# Patient Record
Sex: MALE | Race: WHITE
[De-identification: names, ages, dates, MRNs, and addresses within clinical notes are randomized per-mention and may not be internally consistent; named-entity substitution may affect disease eponyms.]

---

## 2018-12-06 VITALS — SYSTOLIC BLOOD PRESSURE: 116 MMHG | DIASTOLIC BLOOD PRESSURE: 62 MMHG

## 2019-03-13 ENCOUNTER — HOSPITAL ENCOUNTER (OUTPATIENT)
Dept: HOSPITAL 61 - US | Age: 74
Discharge: HOME | End: 2019-03-13
Attending: INTERNAL MEDICINE
Payer: COMMERCIAL

## 2019-03-13 DIAGNOSIS — I83.92: ICD-10-CM

## 2019-03-13 DIAGNOSIS — I73.9: Primary | ICD-10-CM

## 2019-03-13 PROCEDURE — 93970 EXTREMITY STUDY: CPT

## 2019-03-13 PROCEDURE — 93925 LOWER EXTREMITY STUDY: CPT

## 2019-03-14 NOTE — RAD
MR#: [id number]

Account#: [id number]

Accession#: [id number]

Date of Study: 03/13/2019

Ordering Physician: LAVON MARCOS, 

Referring Physician: LAVON MARCOS, 

Tech: Jelani Lala MBA, RDMS, RVT, RDCS, RTR





--------------- APPROVED REPORT --------------





Patient Location : OUT-PATIENT



Indications

PAD



Findings

Grayscale images of the bilateral lower extremity superficial veins reveal that the previous greater 
saphenous veins were stripped bilaterally.



The bilateral lesser saphenous veins do not show any obvious evidence of thrombus and also do not abby
w any evidence of reflux.



Incidental note is made of large varicose veins in the superficial aspect of the right calf.



Critical Notification

Critical Value: No



<Conclusion>

No significant reflux in the bilateral lesser saphenous veins. Bilateral greater saphenous veins have
 been previously ablated/stripped



Signed by : Lavon Marcos, 

Electronically Approved : 03/14/2019 16:34:56

## 2019-03-14 NOTE — RAD
MR#: [id number]

Account#: [id number]

Accession#: [id number]

Date of Study: 03/13/2019

Ordering Physician: LAVON MARCOS, 

Referring Physician: LAVON MARCOS, 

Tech: Jelani Lala MBA, RDMS, RVT, RDCS, RTR





--------------- APPROVED REPORT --------------





Patient Location: OUT-PATIENT



Indications

PAD



VELOCITY AND DOPPLER WAVEFORM ANALYSIS

RIGHT cm/secWaveformSeverity LEFT cm/secWaveform Severity 

dCFA 119.0BiphasicdCFA 88.0Triphasic

Prof Fem Art. 68.0BiphasicProf Fem Art. 52.0Biphasic

Fem Art Prox. 200.0TriphasicFem Art Prox. 198.0Triphasic

Fem Art Mid. 311.0TriphasicFem Art Mid. 127.0Triphasic

Fem Art Dist. 92.0TriphasicFem Art Dist. 85.0Triphasic

Pop Art(Fossa) 70.0TriphasicPop Art(AK) 48.0Triphasic

PTA Prox. 60.0MonophasicPTA Prox. 47.0Monophasic

PTA Dist. 89.0MonophasicPTA Dist. 47.0Monophasic

KERRY Prox. 48.0TriphasicATA Prox. 75.0Triphasic

DPA 41MonophasicDPA 60Monophasic



Image





Findings

Grayscale images of the bilateral lower extremity arterial vessels reveals diffuse athero-'s chronic 
plaque. Spectral waveforms and color Doppler in the common femoral and profunda femoris on the right 
are within normal limits. Velocities are elevated in the mid SFA suggestive of greater than 50% steno
sis. There is 2 vessel runoff below the knee with nonvisualization of the peroneal artery.



On the left there are triphasic waveforms from the common femoral artery to the popliteal segment. Be
low the knee the posterior tibial and anterior tibial vessels demonstrate monophasic and triphasic wa
veforms respectively without any focal stenosis. The peroneal artery again is not visualized.



Critical Notification

Critical Value: No



<Conclusion>

1. Likely high-grade stenosis involving the right mid SFA.



Signed by : Lavon Marcos, 

Electronically Approved : 03/14/2019 16:39:06

## 2019-03-26 ENCOUNTER — HOSPITAL ENCOUNTER (OUTPATIENT)
Dept: HOSPITAL 61 - CCL | Age: 74
Discharge: HOME | End: 2019-03-26
Attending: INTERNAL MEDICINE
Payer: COMMERCIAL

## 2019-03-26 VITALS
DIASTOLIC BLOOD PRESSURE: 61 MMHG | DIASTOLIC BLOOD PRESSURE: 61 MMHG | DIASTOLIC BLOOD PRESSURE: 61 MMHG | DIASTOLIC BLOOD PRESSURE: 61 MMHG | SYSTOLIC BLOOD PRESSURE: 119 MMHG | SYSTOLIC BLOOD PRESSURE: 119 MMHG | SYSTOLIC BLOOD PRESSURE: 119 MMHG | DIASTOLIC BLOOD PRESSURE: 61 MMHG | SYSTOLIC BLOOD PRESSURE: 119 MMHG | SYSTOLIC BLOOD PRESSURE: 119 MMHG | SYSTOLIC BLOOD PRESSURE: 119 MMHG | DIASTOLIC BLOOD PRESSURE: 61 MMHG

## 2019-03-26 VITALS — SYSTOLIC BLOOD PRESSURE: 103 MMHG | DIASTOLIC BLOOD PRESSURE: 64 MMHG

## 2019-03-26 VITALS — SYSTOLIC BLOOD PRESSURE: 125 MMHG | DIASTOLIC BLOOD PRESSURE: 66 MMHG

## 2019-03-26 VITALS — DIASTOLIC BLOOD PRESSURE: 76 MMHG | SYSTOLIC BLOOD PRESSURE: 122 MMHG

## 2019-03-26 VITALS — DIASTOLIC BLOOD PRESSURE: 79 MMHG | SYSTOLIC BLOOD PRESSURE: 110 MMHG

## 2019-03-26 VITALS — WEIGHT: 205 LBS | HEIGHT: 69 IN | BODY MASS INDEX: 30.36 KG/M2

## 2019-03-26 VITALS — SYSTOLIC BLOOD PRESSURE: 121 MMHG | DIASTOLIC BLOOD PRESSURE: 73 MMHG

## 2019-03-26 VITALS — SYSTOLIC BLOOD PRESSURE: 97 MMHG | DIASTOLIC BLOOD PRESSURE: 54 MMHG

## 2019-03-26 VITALS — SYSTOLIC BLOOD PRESSURE: 111 MMHG | DIASTOLIC BLOOD PRESSURE: 62 MMHG

## 2019-03-26 VITALS — DIASTOLIC BLOOD PRESSURE: 79 MMHG | SYSTOLIC BLOOD PRESSURE: 139 MMHG

## 2019-03-26 DIAGNOSIS — Z95.5: ICD-10-CM

## 2019-03-26 DIAGNOSIS — I25.2: ICD-10-CM

## 2019-03-26 DIAGNOSIS — J44.9: ICD-10-CM

## 2019-03-26 DIAGNOSIS — Z82.49: ICD-10-CM

## 2019-03-26 DIAGNOSIS — E78.5: ICD-10-CM

## 2019-03-26 DIAGNOSIS — I25.110: ICD-10-CM

## 2019-03-26 DIAGNOSIS — I70.213: Primary | ICD-10-CM

## 2019-03-26 DIAGNOSIS — I50.9: ICD-10-CM

## 2019-03-26 DIAGNOSIS — Z01.818: ICD-10-CM

## 2019-03-26 DIAGNOSIS — Z79.899: ICD-10-CM

## 2019-03-26 DIAGNOSIS — F17.200: ICD-10-CM

## 2019-03-26 DIAGNOSIS — I11.0: ICD-10-CM

## 2019-03-26 LAB
ANION GAP SERPL CALC-SCNC: 8 MMOL/L (ref 6–14)
BUN SERPL-MCNC: 30 MG/DL (ref 8–26)
CALCIUM SERPL-MCNC: 8.6 MG/DL (ref 8.5–10.1)
CHLORIDE SERPL-SCNC: 105 MMOL/L (ref 98–107)
CO2 SERPL-SCNC: 29 MMOL/L (ref 21–32)
CREAT SERPL-MCNC: 1.8 MG/DL (ref 0.7–1.3)
ERYTHROCYTE [DISTWIDTH] IN BLOOD BY AUTOMATED COUNT: 15.3 % (ref 11.5–14.5)
GFR SERPLBLD BASED ON 1.73 SQ M-ARVRAT: 37.3 ML/MIN
GLUCOSE SERPL-MCNC: 97 MG/DL (ref 70–99)
HCT VFR BLD CALC: 43.4 % (ref 39–53)
HGB BLD-MCNC: 13.8 G/DL (ref 13–17.5)
MCH RBC QN AUTO: 28 PG (ref 25–35)
MCHC RBC AUTO-ENTMCNC: 32 G/DL (ref 31–37)
MCV RBC AUTO: 89 FL (ref 79–100)
PLATELET # BLD AUTO: 236 X10^3/UL (ref 140–400)
POTASSIUM SERPL-SCNC: 4.8 MMOL/L (ref 3.5–5.1)
PROTHROMBIN TIME: 12.4 SEC (ref 11.7–14)
RBC # BLD AUTO: 4.85 X10^6/UL (ref 4.3–5.7)
SODIUM SERPL-SCNC: 142 MMOL/L (ref 136–145)
WBC # BLD AUTO: 8.1 X10^3/UL (ref 4–11)

## 2019-03-26 PROCEDURE — 75710 ARTERY X-RAYS ARM/LEG: CPT

## 2019-03-26 PROCEDURE — C1769 GUIDE WIRE: HCPCS

## 2019-03-26 PROCEDURE — 36245 INS CATH ABD/L-EXT ART 1ST: CPT

## 2019-03-26 PROCEDURE — 75625 CONTRAST EXAM ABDOMINL AORTA: CPT

## 2019-03-26 PROCEDURE — 80048 BASIC METABOLIC PNL TOTAL CA: CPT

## 2019-03-26 PROCEDURE — 93880 EXTRACRANIAL BILAT STUDY: CPT

## 2019-03-26 PROCEDURE — 85027 COMPLETE CBC AUTOMATED: CPT

## 2019-03-26 PROCEDURE — 99152 MOD SED SAME PHYS/QHP 5/>YRS: CPT

## 2019-03-26 PROCEDURE — 99153 MOD SED SAME PHYS/QHP EA: CPT

## 2019-03-26 PROCEDURE — 36246 INS CATH ABD/L-EXT ART 2ND: CPT

## 2019-03-26 PROCEDURE — 85610 PROTHROMBIN TIME: CPT

## 2019-03-26 PROCEDURE — C1892 INTRO/SHEATH,FIXED,PEEL-AWAY: HCPCS

## 2019-03-26 PROCEDURE — 36415 COLL VENOUS BLD VENIPUNCTURE: CPT

## 2019-03-26 NOTE — CARD
MR#: [id number]

Account#: [id number]

Accession#: [id number]

Date of Study: 03/26/2019

Ordering Physician: LAVON MARCOS, 

Referring Physician: LAVON MARCOS, 

Tech: Maritaanderson Gunderson RTR





--------------- APPROVED REPORT --------------





Patient StatusCLI

Cardiac Technologist:      Marita Gunderson RTR

Procedure(s) performed: Moderate Sedation time: 43 MIN

FLUORO TIME: 2.6 MIN

Abdominal aortogram with unilateral ileofemoral run-off



HISTORY

The patient is a 74 year-old male with a history of : coronary artery disease, tobacco history() , hy
pertension, dyslipidemia.



INDICATION FOR PROCEDURE

The indication(s) include : Bilateral claudication.



PROCEDURE NARRATIVE

After appropriate informed consent, the patient was brought to the cath lab and placed in the supine 
position. Preprocedural timeout was completed and confirmed the right patient and procedure. The bila
teral groins were prepped and draped in usual sterile fashion. Moderate sedation acheived with Fentan
yl and Versed. The patient received 2000 units of Heparin for anticoagulation. 



Access: Under lidocaine local anesthesia, a 5Fr introducer sheath was placed in the LCFA via the tavon
fied seldinger technique with a J-tipped guidewire and an 18g needle. Diagnostic angiography was then
 performed using a 5Fr Omniflush catheter with digital subtraction angiography. Next, the contralater
al (RCFA) was accessed with the aid of the Omniflush catheter and a J-tipped guidewire. The omniflush
 was then exchanged for a long angled glide catheter which was then placed in the RCFA. Repeat right 
lower extremity with DSA was performed. Subsequently, the glidecatheter was used to do a pullback man
euver on the MIGUEL stenosis. No significant stenosis was identified at the levels of the common iliac 
arteries and the left external iliac artery. 



FINDINGS: 

AO: 120/80



AORTA: Moderate adventitial calcification without significant disease. 

RENAL arteries: Not well visualized. Grossly appear patent. 

RCIA: No significant disease. 

REIA: No significant disease. 

RIIA: Mild to moderate diffuse irregularities. 

RCFA: No significant disease. 

RSFA: Significant diffuse negative remodeling with a mid 60% stenosis. 

RPOP: No significant disease. 

RAT: Small caliber vessel with mid subtotal occlusion. 

RTP trunk: No significant disease. 

RPER: Small vessel with diffuse distal disease. 

RPT: Patent and provides robust flow to the foot. 



LCIA: Mild diffuse irregularities of upto 20%. 

MIGUEL: No significant disease.  

LIIA: Mild to moderate diffuse irregularities of up to 40%. 

LCFA: No significant disease. 



At case completion, the left sided sheath was removed via manual compression.



Conclusion

1. No significant aorto-iliac disease. 

2. Moderate non-critical RSFA disease. 





Recommendations

1. Due to lack of any significant CLI, will plan for continued exercise therapy with close outpatient
 ultrasound monitoring and consider PVI in the future as needed. 

2. Smoking cessation



Signed by : Lavon Marcos, 

Electronically Approved : 03/26/2019 13:36:10

## 2019-03-26 NOTE — PDOC
MODERATE SEDATION ASSESSMENT


RISKS/ALTERNATIVES


Risks/Alternatives


Risks and alternatives of this type of sedation and procedure discussed with:


RISK/ALTERNATIVES:  Patient





H & P ON CHART


H & P


H & P on chart and reviewed for co-morbid conditions and appropriate labs.


H&P ON CHART:  Yes





PREGNANCY STATUS


PREG STATUS ASSESSED:  N/A





MEDS/ALLERGIES REVIEWED


Meds/Allergies Reviewed


Medications and Allergies including time and route of recently administered 

narcotics and sedatives.


MEDS/ALLERGIES REVIEWED:  Yes





ASA RATING


ASA RATING:  II





AIRWAY ASSESSMENT


Airway Assessment


Airway patency, oral function limitations, presence  of caps, crowns, dentures, 

partials, and ability to extend neck assessed.


AIRWAY ASSESSMENT:  Yes





MALLAMPATI SCORE


MALLAMPATI SCORE:  II





PRE-SEDATION ASSESSMENT


PRE-SEDATION ASSESSMENT:  Yes











LAVON MARCOS MD Mar 26, 2019 10:58

## 2019-03-26 NOTE — RAD
MR#: [id number]

Account#: [id number]

Accession#: [id number]

Date of Study: 03/26/2019

Ordering Physician: LAVON MARCOS,

Referring Physician: LAVON MARCOS,

Tech: Jelani Lala MBA, RDMS, RVT, RDCS, RTR





--------------- APPROVED REPORT --------------





Patient Location: OUT-PATIENT

Laterality:Bilateral



Indications

PAD



Doppler Spectral Velocity Analysis

Right   Left    

pCCA     76/14 cm/spCCA     109/19 cm/s

mCCA     65/13 cm/smCCA     71/16 cm/s

dCCA     46/11 cm/sdCCA     73/18 cm/s

Bulb     47/12 cm/sBulb     58/15 cm/s

ECA      89/ cm/sECA      63/ cm/s

pICA     65/23 cm/spICA     63/21 cm/s

Otis     72/26 cm/smICA     90/32 cm/s

dICA     74/25 cm/sdICA     85/26 cm/s

Vert.    69/ cm/sVert.    

Subcl.   85/ cm/sSubcl.   106/ cm/s

ICA/CCA  0.97ICA/CCA  0.83



Findings

Grayscale images of the bilateral common carotid, internal and external carotid vessels reveals mild 
intimal hyperplasia and minimal atherosclerotic plaque.



Spectral waveforms and color Doppler evaluation does not reveal any focal high-grade stenosis involvi
ng the internal carotid arteries. No high-grade stenosis is noted in the external carotid arteries. O
verall 0 to less than 50% stenosis by velocity criteria in the internal carotid system.



The right vertebral velocity is within normal limits and appears to be antegrade. The left vertebral 
was not visualized/difficult images reveal no obvious flow noted.



Normal ICA to CCA ratios noted.



Normal subclavian velocities bilaterally.



Critical Notification

Critical Value: No



<Conclusion>

1. No significant carotid occlusive disease bilaterally.

2. Nonvisualized left vertebral artery, possibly related to congenitally small vessel versus occlusio
n.



Signed by : Lavon Marcos, 

Electronically Approved : 03/26/2019 14:52:51

## 2020-05-08 ENCOUNTER — HOSPITAL ENCOUNTER (OUTPATIENT)
Dept: HOSPITAL 61 - CT | Age: 75
Discharge: HOME | End: 2020-05-08
Attending: NURSE PRACTITIONER
Payer: MEDICARE

## 2020-05-08 DIAGNOSIS — N28.89: ICD-10-CM

## 2020-05-08 DIAGNOSIS — K40.90: Primary | ICD-10-CM

## 2020-05-08 DIAGNOSIS — K57.30: ICD-10-CM

## 2020-05-08 DIAGNOSIS — I25.10: ICD-10-CM

## 2020-05-08 DIAGNOSIS — I70.0: ICD-10-CM

## 2020-05-08 DIAGNOSIS — J98.4: ICD-10-CM

## 2020-05-08 PROCEDURE — 74176 CT ABD & PELVIS W/O CONTRAST: CPT

## 2020-05-08 NOTE — RAD
EXAM: CT Abdomen and Pelvis without IV contrast

 

CLINICAL HISTORY: LLQ PAIN. 

 

COMPARISON: none

 

TECHNIQUE: Helical CT of the abdomen and pelvis was performed without the 

administration of IV contrast. Axial, coronal and sagittal reformatted 

images were generated.

 

---PQRS compliance statement - One or more of the following individualized

dose reduction techniques were utilized for this study:

1.  Automated exposure control

2.  Adjustment of the mA and/or kV according to patient size

3.  Use of iterative reconstruction technique---

 

FINDINGS:

Lack of intravenous contrast limits evaluation of solid organs, 

vasculature, and lymph nodes. 

 

Lower chest: Linear opacities in the lingula, left lower lobe and medial 

right lower lobe likely scarring/atelectasis. Coronary artery 

calcifications are seen.

 

Abdomen and pelvis:

Liver and biliary system: Subcentimeter hypodense left hepatic lobe 

lesions too small to accurately characterize.  Gallbladder is normal. No 

biliary ductal dilatation.

 

Spleen: Unremarkable

 

Pancreas: Unremarkable

 

Adrenal glands: Unremarkable

 

Kidneys: No renal tract calculus. Subcentimeter hypodense right upper pole

renal lesion is too small to accurately characterize.

 

Lymph nodes/retroperitoneum: No abdominal or pelvic lymphadenopathy.

 

Vessels: Dense aortic calcifications are seen.

 

Bowel/Peritoneal cavity: Moderate colonic stool content is seen in the 

right colon. The left colon and sigmoid colon are essentially 

decompressed. A few colonic diverticula are seen without evidence for 

acute diverticulitis. No small or large bowel dilatation. No bowel 

obstruction.  Appendix is normal.  

No abdominal or pelvic ascites.

 

Abdominal wall: Small fat-containing left inguinal hernia.

 

Bladder: Bladder is unremarkable. 

 

Bones: Degenerative changes of the spine are seen. No aggressive osseous 

lesion. Changes of left total hip arthroplasty are seen. Prior vertebral 

augmentation changes of L2 are seen. Severe right hip joint 

osteoarthritis.

 

IMPRESSION:

1.  A few colonic diverticula are seen without evidence for acute 

diverticulitis. The descending colon and rectosigmoid are essentially 

decompressed.

2.  No bowel obstruction.

3.  Fat-containing left inguinal hernia.

4.  Appendix is normal.

5.  No renal tract calculus.

 

Electronically signed by: Cristian Albarran MD (5/8/2020 4:09 PM) SVETLANA

## 2020-07-20 ENCOUNTER — HOSPITAL ENCOUNTER (OUTPATIENT)
Dept: HOSPITAL 61 - NM | Age: 75
Discharge: HOME | End: 2020-07-20
Attending: INTERNAL MEDICINE
Payer: MEDICARE

## 2020-07-20 DIAGNOSIS — I08.0: Primary | ICD-10-CM

## 2020-07-20 DIAGNOSIS — I70.203: ICD-10-CM

## 2020-07-20 DIAGNOSIS — I25.2: ICD-10-CM

## 2020-07-20 DIAGNOSIS — R00.1: ICD-10-CM

## 2020-07-20 DIAGNOSIS — I25.5: ICD-10-CM

## 2020-07-20 DIAGNOSIS — I10: ICD-10-CM

## 2020-07-20 PROCEDURE — 93017 CV STRESS TEST TRACING ONLY: CPT

## 2020-07-20 PROCEDURE — 93306 TTE W/DOPPLER COMPLETE: CPT

## 2020-07-20 PROCEDURE — 78452 HT MUSCLE IMAGE SPECT MULT: CPT

## 2020-07-20 PROCEDURE — 93925 LOWER EXTREMITY STUDY: CPT

## 2020-07-20 PROCEDURE — A9500 TC99M SESTAMIBI: HCPCS

## 2020-07-20 NOTE — RAD
MR#: [id number]

Account#: [id number]

Accession#: [id number]

Date of Study: 07/20/2020

Ordering Physician: LAVON MARCOS, 

Referring Physician: BRANDON JOHNSON Tech: RT Earl (R) (N)





--------------- APPROVED REPORT --------------





Test Type:          Exercise

Stress Nurse/Tech: Kate Atkinson R.N.

Test Indications: ischemic cardiomyopathy

Cardiac History: CAD, cabg, MI x 3, htn,

Medications:     isosorbiide, lisinopril, metoprolol

Medical History: see ehr

Resting ECG:     SR

Resting Heart Rate: 51 bpm

Resting Blood Pressure: 133/59mmHg

Pretest Chest Pain: No chest pain



Nurse/Tech Notes

lungs cta, heart tones irregular

Consent: The procedure was explained to the patient in lay terms. Informed consent was witnessed. Crow
eout was entered into [facility]. History and Stress Test performed by RT Jeromy (R) (N)



Stress Symptoms

No chest pain or symptoms. Dyspnea



POST EXERCISE

Reason for Termination: Reached target heart rate

Target HR: Yes

Max HR: 130 bpm

88% of Maximum Predicted HR: 147 bpm

Exercise duration: 5:38 min:sec, 2 Stage

Exercise capacity: 4.6METs

Max Blood Pressure: 164/66mmHg

Blood Pressure response to exercise: Normal blood pressure response during stress.

Heart Rate response to exercise: normal

Chest Pain: No. 

Arrhythmia: Yes. PAC and PVC noted



INTERPRETATION

Stress EKG Conclusion: The resting EKG showed a sinus bradycardia.

The stress EKG showed ST depression in the inferior and lateral leads suggestive of ischemia.





Imaging Protocol

IMAGE PROTOCOL: Rest Tc-99m/stress Tc-99m 1 day



Rest:            Stress:         Viability:   

Radiopharm.Tc99m LirmpqqpfRo00s Sestamibi

Hgyd99iDe            32.6mCi            

Img Date  07/20/2020 07/20/2020      

Inj-Img Rtma96bnd.           60min.           



Rest Admin Site:IV - Right AntecubitalAdministrator:CAT James, ARRT (R)(N)

Stress Admin Site: IV - Right AntecubitalAdministrator: RT Scott PinzonR)(N)



STRESS DATA

End Diast. Vol.81.0mlLVEDV index BSA35.0ml

End Syst. Vol.39.0mlLVESV index BSA17.0ml

Myocardial Clns467.0gEject. Pizhbogb69.0%



Stress Scores

Regional WT2.00Summed WT9.00

Regional WM1.00Summed WM12.00



LV Perfusion

The stress scans showed an inferior lateral defect.

The rest scans showed an inferior lateral defect.

Nuclear imaging is consistent with an inferior lateral infarct with some probable mild nida-infarct r
eversible ischemia



Wall Motion

Left ventricular systolic function shows an ejection fraction of 54% and mild inferior hypokinesis.



LV Perf. Quant

17 Seg. SSS11.00

17 Seg. SRS20.00

17 Seg. SDS0.00

Stress Defect Extent (% LAD)0.00Rest Defect Extent (% LAD)33.10Rev. Defect Extent (% LAD)0.00

Stress Defect Extent (% LCX) 50.00Rest Defect Extent (% LCX)50.00Rev. Defect Extent (% LCX)0.00

Stress Defect Extent (% RCA)18.90Rest Defect Extent (% RCA)37.80Rev. Defect Extent (% RCA)0.00

Stress Defect Extent (% ROSANA)19.30Rest Defect Extent (% ROSANA)35.40Rev. Defect Extent (% ROSANA)0.00



Conclusion

1. Good exercise tolerance with the patient walking for 5 minutes and 38 seconds on a Garrison protocol 
reaching  88% of predicted heart rate.

2. No reported chest pain with exertion.

3. The EKG shows inferior lateral ST segment changes suggestive of ischemia.

4. Nuclear imaging shows an inferior lateral infarct with some probable mild nida-infarct ischemia.

5. Left ventricular systolic function shows mild inferior wall hypokinesis and an ejection fraction o
f 54%.

6. Low to moderate risk treadmill nuclear stress test consistent with a prior inferior infarct.



Signed by : Floyd Gonzales MD

Electronically Approved : 07/20/2020 16:40:46

## 2020-07-20 NOTE — CARD
MR#: [id number]

Account#: [id number]

Accession#: [id number]

Date of Study: 07/20/2020

Ordering Physician: LAVON MARCOS, 

Referring Physician: LAVON MARCOS, 

Tech: Sue Beyer [facility]





--------------- APPROVED REPORT --------------





EXAM: Two-dimensional and M-mode echocardiogram with Doppler and color Doppler.



Other Information 

Quality : Technically Limited

Technically limited study due to  lung/rib interference



INDICATION

Ischemic Cardiomyopathy; History of CABG



2D DIMENSIONS 

Left Atrium(2D)2.9 (1.6-4.0cm)IVSd0.8 (0.7-1.1cm)

Aortic Root(2D)2.5 (2.0-3.7cm)LVDd4.6 (3.9-5.9cm)

PWd0.7 (0.7-1.1cm)LVDs2.9 (2.5-4.0cm)



Tricuspid Valve

TR P. Ysbicfhr528im/sRAP ZWBWUFDG3lkTj

TR Peak Gr.58qxDvTNTT84ssPj



 LEFT VENTRICLE 

The left ventricle is normal size. There is normal left ventricular wall thickness. The left ventricu
lar systolic function is normal and the ejection fraction is within normal range. The Ejection Fracti
on is 55-60%. There is normal LV segmental wall motion. Transmitral Doppler flow pattern is Grade I-a
bnormal relaxation pattern.



 RIGHT VENTRICLE 

The right ventricle is normal size. The right ventricular systolic function is normal.



 ATRIA 

The left atrium size is normal. The right atrium size is normal. The interatrial septum is intact wit
h no evidence for an atrial septal defect or patent foramen ovale as noted on 2-D or Doppler imaging.




 AORTIC VALVE 

The aortic valve is calcified and appears to open well. The aortic valve is probably trileaflet. Dopp
ler and Color Flow revealed no significant aortic regurgitation. There is no significant aortic valvu
lar stenosis.



 MITRAL VALVE 

The mitral valve is calcified but opens well. Mitral annular calcification is mild. There is no evide
nce of mitral valve prolapse. There is no mitral valve stenosis. Doppler and Color Flow revealed no m
itral valve regurgitation noted.



 TRICUSPID VALVE 

The tricuspid valve is normal in structure and function. Doppler and Color Flow revealed trace tricus
pid regurgitation. The PA pressure was estimated at 23 mmHg. There is no tricuspid valve stenosis.



 PULMONIC VALVE 

The pulmonic valve is not visualized.



 GREAT VESSELS 

The aortic root is normal in size. The ascending aorta is not well seen. The IVC was not visualized.



 PERICARDIAL EFFUSION 

There is no evidence of significant pericardial effusion.



Critical Notification

Critical Value: No



<Conclusion>

The left ventricle is normal size.

The left ventricular systolic function is normal and the ejection fraction is within normal range.

The Ejection Fraction is 55-60%.

Doppler and Color Flow revealed no significant aortic regurgitation.

There is no significant aortic valvular stenosis.

Doppler and Color Flow revealed no mitral valve regurgitation noted.

Doppler and Color Flow revealed trace tricuspid regurgitation.

The PA pressure was estimated at 23 mmHg.



Signed by : Floyd Gonzales MD

Electronically Approved : 07/20/2020 14:09:15

## 2020-07-20 NOTE — RAD
MR#: [id number]

Account#: [id number]

Accession#: [id number]

Date of Study: 07/20/2020

Ordering Physician: LAVON MARCOS, 

Referring Physician: LAVON MARCOS, 

Tech: Jelani Lala MBA, RDMS, RVT, RDCS, RTR





--------------- APPROVED REPORT --------------





Patient Location: OUT-PATIENT



Indications

PAD



VELOCITY AND DOPPLER WAVEFORM ANALYSIS

RIGHT cm/secWaveformSeverity LEFT cm/secWaveform Severity 

dCFA 140.0BiphasicdCFA 157.0Biphasic

Prof Fem Art. 186.0BiphasicProf Fem Art. 168.0Biphasic

Fem Art Prox. 213.0BiphasicFem Art Prox. 215.0Biphasic

Fem Art Mid. 200.0BiphasicFem Art Mid. 156.0Biphasic

Fem Art Dist. 185.0BiphasicFem Art Dist. 177.0Biphasic

Pop Art(Fossa) 66.0BiphasicPop Art(AK) 105.0Biphasic

PTA Prox. 38.0BiphasicPTA Prox. 43.0Biphasic

PTA Dist. 71.0BiphasicPTA Dist. 60.0Biphasic

KERRY Prox. 76.0BiphasicATA Prox. 83.0Biphasic

DPA 35BiphasicDPA 37Biphasic



Findings

Grayscale images of the bilateral lower extremity arterial vessels demonstrate mild to moderate diffu
se atherosclerosis.  No focal high-grade stenosis identified.  Biphasic waveforms are noted from the 
common femoral artery to the below-knee vessels.  The bilateral peroneal arteries are not well visual
ized.  There is two-vessel runoff below the knee.



Critical Notification

Critical Value: No



<Conclusion>

1.  No significant lower extremity arterial disease bilaterally



Signed by : Lavon Marcos, 

Electronically Approved : 07/20/2020 14:13:52

## 2020-12-07 ENCOUNTER — HOSPITAL ENCOUNTER (OUTPATIENT)
Dept: HOSPITAL 61 - CT | Age: 75
End: 2020-12-07
Attending: FAMILY MEDICINE
Payer: MEDICARE

## 2020-12-07 DIAGNOSIS — K42.9: ICD-10-CM

## 2020-12-07 DIAGNOSIS — K57.32: Primary | ICD-10-CM

## 2020-12-07 DIAGNOSIS — M16.11: ICD-10-CM

## 2020-12-07 LAB
BUN SERPL-MCNC: 29 MG/DL (ref 8–26)
CREAT SERPL-MCNC: 1.9 MG/DL (ref 0.7–1.3)
GFR SERPLBLD BASED ON 1.73 SQ M-ARVRAT: 34.9 ML/MIN

## 2020-12-07 PROCEDURE — 74176 CT ABD & PELVIS W/O CONTRAST: CPT

## 2020-12-07 PROCEDURE — 36415 COLL VENOUS BLD VENIPUNCTURE: CPT

## 2020-12-07 PROCEDURE — 84520 ASSAY OF UREA NITROGEN: CPT

## 2020-12-07 PROCEDURE — 82565 ASSAY OF CREATININE: CPT

## 2020-12-07 NOTE — RAD
EXAM: CT Abdomen and Pelvis without IV contrast

 

CLINICAL HISTORY: lower abd pain

 

COMPARISON: 5/8/20 11/12/2018s

 

TECHNIQUE: Helical CT of the abdomen and pelvis was performed without the 

administration of IV contrast. Axial, coronal and sagittal reformatted 

images were generated.

 

---PQRS compliance statement - One or more of the following individualized

dose reduction techniques were utilized for this study:

1.  Automated exposure control

2.  Adjustment of the mA and/or kV according to patient size

3.  Use of iterative reconstruction technique---

 

FINDINGS:

Lack of intravenous contrast limits evaluation of solid organs, 

vasculature, and lymph nodes. 

 

Lower chest: Linear opacities in the lingula and lower lobes likely 

scarring/atelectasis. Spiculated right lower lobe lung nodule measures 10 

x 6 mm new compared to 5/8/2020.

 

Abdomen and pelvis:

Liver and biliary system: Nonspecific left hepatic lobe hypodense lesions 

may be cystic, grossly stable.  Gallbladder is normal. No biliary ductal 

dilatation.

 

Spleen: Unremarkable

 

Pancreas: Unremarkable

 

Adrenal glands: Unremarkable

 

Kidneys: Subcentimeter hypodense right lower pole renal lesion likely 

cystic. No hydronephrosis. No hydroureter.

 

Lymph nodes/retroperitoneum: No abdominal or pelvic lymphadenopathy.

 

Vessels: Dense atherosclerotic calcifications of the aorta are seen. 

 

Bowel/Peritoneal cavity: Moderate colonic stool content is seen. Appendix 

is normal. No small or large bowel dilatation. No bowel obstruction. 

Fat-containing left inguinal hernia.  Relatively featureless appearance of

the descending colon and sigmoid colon with bowel wall fat. A few colonic 

diverticula are seen.

No abdominal or pelvic ascites

 

Prostate measures approximately 4.2 cm in transverse dimension.

 

Abdominal wall: Trace fat-containing periumbilical hernia.

 

Bladder: Bladder diverticulum is suspected although evaluation limited by 

streak artifact.

 

Bones: Changes of left hip arthroplasty. Severe right hip joint 

osteoarthritis. Degenerative changes of the spine are seen. SI joint 

degenerative changes are seen. Vertebral augmentation changes L2.

 

IMPRESSION:

1.  Decompressed, relatively featureless appearance of the descending 

colon and sigmoid colon possibly inflammatory bowel disease.

2.  A few colonic diverticula are seen without evidence for acute 

diverticulitis.

3.  Spiculated 10 x 6 mm right lower lobe lung nodule is new, suspicious 

for malignancy. Consider further evaluation with PET/CT although 

borderline sized threshold otherwise short interval follow-up CT chest in 

3 months is recommended.

 

 

Message was left with Dr. GUAMAN's answering service at 12/7/2020 2:37

PM.s

 

**********FOR INTERNAL CODING PURPOSES**********

 

 

RESULT CODE: (FUP)  

 

 

 

Electronically signed by: Cristian Albarran MD (12/7/2020 2:39 PM) HAIMSHERI

## 2020-12-15 ENCOUNTER — HOSPITAL ENCOUNTER (OUTPATIENT)
Dept: HOSPITAL 63 - LAB | Age: 75
End: 2020-12-15
Attending: NURSE ANESTHETIST, CERTIFIED REGISTERED
Payer: MEDICARE

## 2020-12-15 DIAGNOSIS — Z01.818: Primary | ICD-10-CM

## 2020-12-15 DIAGNOSIS — Z20.828: ICD-10-CM

## 2020-12-15 DIAGNOSIS — K21.9: ICD-10-CM

## 2020-12-15 PROCEDURE — U0003 INFECTIOUS AGENT DETECTION BY NUCLEIC ACID (DNA OR RNA); SEVERE ACUTE RESPIRATORY SYNDROME CORONAVIRUS 2 (SARS-COV-2) (CORONAVIRUS DISEASE [COVID-19]), AMPLIFIED PROBE TECHNIQUE, MAKING USE OF HIGH THROUGHPUT TECHNOLOGIES AS DESCRIBED BY CMS-2020-01-R: HCPCS

## 2020-12-18 ENCOUNTER — HOSPITAL ENCOUNTER (OUTPATIENT)
Dept: HOSPITAL 63 - SURG | Age: 75
Discharge: HOME | End: 2020-12-18
Attending: INTERNAL MEDICINE
Payer: MEDICARE

## 2020-12-18 VITALS — DIASTOLIC BLOOD PRESSURE: 77 MMHG | SYSTOLIC BLOOD PRESSURE: 106 MMHG

## 2020-12-18 DIAGNOSIS — K64.0: ICD-10-CM

## 2020-12-18 DIAGNOSIS — K57.30: ICD-10-CM

## 2020-12-18 DIAGNOSIS — D12.3: ICD-10-CM

## 2020-12-18 DIAGNOSIS — K21.9: ICD-10-CM

## 2020-12-18 DIAGNOSIS — R12: ICD-10-CM

## 2020-12-18 DIAGNOSIS — D12.0: ICD-10-CM

## 2020-12-18 DIAGNOSIS — Z79.899: ICD-10-CM

## 2020-12-18 DIAGNOSIS — K44.9: ICD-10-CM

## 2020-12-18 DIAGNOSIS — K29.50: ICD-10-CM

## 2020-12-18 DIAGNOSIS — Z79.82: ICD-10-CM

## 2020-12-18 DIAGNOSIS — R19.5: Primary | ICD-10-CM

## 2020-12-18 PROCEDURE — 43239 EGD BIOPSY SINGLE/MULTIPLE: CPT

## 2020-12-18 PROCEDURE — 45385 COLONOSCOPY W/LESION REMOVAL: CPT

## 2020-12-18 PROCEDURE — 88342 IMHCHEM/IMCYTCHM 1ST ANTB: CPT

## 2020-12-18 PROCEDURE — 45380 COLONOSCOPY AND BIOPSY: CPT

## 2020-12-18 PROCEDURE — 88341 IMHCHEM/IMCYTCHM EA ADD ANTB: CPT

## 2020-12-18 PROCEDURE — 88305 TISSUE EXAM BY PATHOLOGIST: CPT

## 2020-12-23 NOTE — PATHOLOGY
[facility] Accession Number: [id number]

.                                                                01

Material submitted:                                        .

PART A: stomach - ANTRUM GASTRITIS/GASTRIC ULCERS

PART B: cecum - CECUM POLYP

PART C: colon - TRANSVERSE POLYP. Modifiers: transverse

.                                                                02

**********************************************************************

Diagnosis:

A.  "Antrum gastritis/gastric ulcer", biopsy:

- Gastric antral-type mucosa with reactive/regenerative changes and mild

acute and chronic inflammation.  (See comment).

.

B.  "Cecum polyp", biopsy:

- Tubular adenoma; no high grade dysplasia.

.

C.  "Transverse polyp", biopsy:

- Tubular adenoma; no high grade dysplasia.

.

(CLW:mml; 12/22/2020)

[facility]  12/22/2020  [address] [facility]

**********************************************************************

.                                                                02

Comment:

Properly-controlled immunohistochemical stains are performed:

.

Block A1:

H. pylori - Negative for organisms;

AE1/AE3 - No abnormal staining.

.

Clinical and endoscopic correlation is required.

.

(CLW:mml; 12/22/2020)

.                                                                02

Electronically signed:                                     .

Valarie Corley MD, Pathologist

NPI- [id number]

.                                                                01

Gross description:                                         .

A.  The specimen is received in formalin, labeled "Skip Wolfe Jr., antrum

gastritis/gastric ulcer".  Received are two segments of pale tan soft

tissue ranging in size from 0.3 to 0.4 cm in maximum dimensions.  The

specimen is submitted entirely in cassette A1.

.

B.  The specimen is received in formalin, labeled "Skip Wolfe Jr., cecum

polyp".  Received are five segments of pale tan soft tissue ranging in

size from 0.3 to 0.4 cm in maximum dimensions.  The specimen is submitted

entirely in cassette B1.

.

C.  The specimen is received in formalin, labeled "Skip Wolfe Jr.,

transverse polyp".  Received are two segments of pale tan soft tissue

ranging in size from 0.3 to 1.1 cm in maximum dimensions.  The specimen is

submitted entirely in cassette C1.

(CAA; 12/21/2020)

QAC/QAC  12/21/2020  [address] [facility]

.                                                                02

Pathologist provided ICD-10:

K29.00, K29.50, D12.0, D12.3

.                                                                02

CPT                                                        .

129603, 743942, 472069, S72736, F08571

Specimen Comment: A courtesy copy of this report has been sent to [phone], [phone]

Specimen Comment: [id number]

Specimen Comment: Report sent to  / DR POTTER

***Performed at:  01

   [facility]

   [address], [place]  [id number]

   MD Vargas Malin MD Phone:  [phone]

***Performed at:  02

   [facility]

   [address] [place], [place]  [id number]

   MD Rey Ruiz MD Phone:  [phone]

## 2021-01-25 ENCOUNTER — HOSPITAL ENCOUNTER (OUTPATIENT)
Dept: HOSPITAL 61 - CT | Age: 76
End: 2021-01-25
Attending: INTERNAL MEDICINE
Payer: MEDICARE

## 2021-01-25 DIAGNOSIS — J43.9: ICD-10-CM

## 2021-01-25 DIAGNOSIS — I25.10: ICD-10-CM

## 2021-01-25 DIAGNOSIS — R91.1: Primary | ICD-10-CM

## 2021-01-25 DIAGNOSIS — J47.9: ICD-10-CM

## 2021-01-25 DIAGNOSIS — M85.88: ICD-10-CM

## 2021-01-25 DIAGNOSIS — K76.9: ICD-10-CM

## 2021-01-25 PROCEDURE — 71250 CT THORAX DX C-: CPT

## 2021-01-25 NOTE — RAD
EXAM: CT Chest without IV contrast



INDICATION: Reason: lung nodule / Spl. Instructions:  / History: 



TECHNIQUE:  Multi-detector row CT images were acquired from the thoracic inlet through the upper abdo
men without the use of IV contrast. Sagittal and coronal images were acquired from the transaxial darío
a. All CT scans performed at this facility utilize dose optimization techniques as appropriate to the
 exam, including the following: Automated exposure control and adjustment of the mA and/or KV accordi
ng to patient size (this includes techniques or standardized protocols for targeted exams where dose 
is indication/reason for exam).



COMPARISON: CT chest with IV contrast of 5/8/2020 and CT chest without IV contrast of 12/7/2020



FINDINGS: 



The absence of IV contrast limits evaluation of soft tissue pathology.



CARDIOVASCULAR:  Scattered arterial calcifications including dense multivessel coronary calcification
s. There are postsurgical changes consistent with a previous CABG. Normal heart size. No pericardial 
effusion. Upper normal ascending thoracic aortic caliber at 3.8 cm. No evidence of an intramural hema
triston.



MEDIASTINUM & YASMIN: No adenopathy or masses. Small amount of frothy fluid at the distal trachea depen
dently.



LUNGS: Mild emphysematous changes. No pulmonary infiltrate, nodule, or other focal abnormality. Minim
al bronchiectasis in lower lobe predominant peribronchial thickening.



PLEURAL SPACE: No pleural effusions or pneumothorax.



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
 the thoracic spine. Partially imaged vertebroplasty changes at L2.



ABDOMEN:  Low-density lesions in the left hepatic lobe are present, statistically likely to be a cyst
. They are unchanged.



IMPRESSION:



Changes of COPD with no residual nodule appreciated. The spiculated nodule in the right lower lobe se
en slightly under 2 months ago has since resolved and likely reflected an inflammatory nodule.  If th
e patient is at high risk for lung cancer, enrollment in a [place] lung cancer screening program could be 
beneficial.



Electronically signed by: Mckay Murphy MD (1/25/2021 9:28 AM) [id number]

## 2021-08-06 ENCOUNTER — HOSPITAL ENCOUNTER (OUTPATIENT)
Dept: HOSPITAL 61 - ECHO | Age: 76
End: 2021-08-06
Attending: INTERNAL MEDICINE
Payer: MEDICARE

## 2021-08-06 DIAGNOSIS — I51.7: ICD-10-CM

## 2021-08-06 DIAGNOSIS — I70.203: Primary | ICD-10-CM

## 2021-08-06 DIAGNOSIS — I25.5: ICD-10-CM

## 2021-08-06 PROCEDURE — 93922 UPR/L XTREMITY ART 2 LEVELS: CPT

## 2021-08-06 PROCEDURE — 93925 LOWER EXTREMITY STUDY: CPT

## 2021-08-06 PROCEDURE — 93306 TTE W/DOPPLER COMPLETE: CPT

## 2021-08-06 NOTE — CARD
MR#: [id number]

Account#: [id number]

Accession#: [id number]

Date of Study: 08/06/2021

Ordering Physician: LAVON MARCOS, 

Referring Physician: LAVON MARCOS, 

Tech: Geno Williamson, [facility]





--------------- APPROVED REPORT --------------





EXAM: Two-dimensional and M-mode echocardiogram with Doppler and color Doppler.



Other Information 

Quality : AverageHR: 77bpm



INDICATION

COPD  

Cardiomyopathy 



Surgery/Intervention

CABG: Site: [place]



RISK FACTORS

Hypertension 

Hyperlipidemia

Smoking 



2D DIMENSIONS 

IVSd1.1 (0.7-1.1cm)Aortic Root(2D)3.3 (2.0-3.7cm)

LVDd4.5 (3.9-5.9cm)LVOT Diameter2.1 (1.8-2.4cm)

PWd1.0 (0.7-1.1cm)LVDs2.6 (2.5-4.0cm)

FS (%) 43.5 %SV70.7 ml

LVEF(%)74.8 (>50%)



Aortic Valve

AoV Peak Nader.145.4cm/sAoV VTI25.6cm

AO Peak GR.8.5mmHgLVOT Peak Nader.121.2cm/s

LVOT  VTI 23.82cmAO Mean GR.4mmHg

ANNETTE (VMAX)2.26ax5KHK   (VTI)3.19cm2



Mitral Valve

MV E Gbotaoll26.4cm/sMV DECEL [id number]

MV A Srjyeqar70.0cm/sMV E Mean Gr.1mmHg

MV [id number]/A  Ratio0.7

MVA (PHT)1.94cm2



TDI

E/Lateral E'4.5E/Medial E'5.4



Tricuspid Valve

TR P. Kbwdtsbn586cb/sRAP ELELFUBA3pqTb

TR Peak Gr.10dlAwLWKN55asUu



Pulmonary Vein

S1 Prupokve64.9cm/sD2 Daconglt36.3cm/s

PVa vherbyda832wylh



 LEFT VENTRICLE 

The left ventricle is normal size. There is borderline to mild concentric left ventricular hypertroph
y. The left ventricular systolic function is normal. The Ejection Fraction is 55-60%. There is normal
 LV segmental wall motion. Transmitral Doppler flow pattern is Grade I-abnormal relaxation pattern.



 RIGHT VENTRICLE 

The right ventricle is borderline dilated. The right ventricular systolic function is normal.



 ATRIA 

The left atrium size is normal. The right atrium size is normal. The interatrial septum is intact wit
h no evidence for an atrial septal defect or patent foramen ovale as noted on 2-D or Doppler imaging.




 AORTIC VALVE 

The aortic valve is not well visualized. Doppler and Color Flow revealed trace aortic regurgitation. 
There is no significant aortic valvular stenosis. Calculated aortic valve area is 2.57 cm2 with maxim
um pressure gradient of 10 mmHg and mean pressure gradient of 5 mmHg.



 MITRAL VALVE 

The mitral valve is normal in structure and function. There is no evidence of mitral valve prolapse. 
There is no mitral valve stenosis. Doppler and Color Flow revealed no mitral valve regurgitation note
d.



 TRICUSPID VALVE 

The tricuspid valve is not well visualized. Doppler and Color Flow revealed trace tricuspid regurgita
tion with an estimated PAP of 33 mmHg. There is no tricuspid valve stenosis.



 PULMONIC VALVE 

The pulmonic valve is not well visualized. Doppler and color-flow analysis was not performed.



 GREAT VESSELS 

The aortic root is normal in size. The IVC was not visualized.



 PERICARDIAL EFFUSION 

There is no evidence of significant pericardial effusion.



Critical Notification

Critical Value: No



<Conclusion>

The left ventricular systolic function is normal.

The Ejection Fraction is 55-60%.

There is normal LV segmental wall motion.

Transmitral Doppler flow pattern is Grade I-abnormal relaxation pattern.

Trace tricuspid regurgitation with an estimated PAP of 33 mmHg.

There is no evidence of significant pericardial effusion.



Signed by : Fritz Mckeon, 

Electronically Approved : 08/06/2021 14:09:37

## 2021-08-11 NOTE — RAD
MR#: [id number]

Account#: [id number]

Accession#: [id number]

Date of Study: 08/06/2021

Ordering Physician: LAVON MARCOS, 

Referring Physician: LAVON MARCOS, 

Tech: Marilyn Dennison RVT, LANNY





--------------- APPROVED REPORT --------------





Patient Location: OUT-PATIENT

Exam Type: Ankle to Brachial Index



Indications

PAD



Risk Factors

Hypertension



Pressures/Indices

                RightABI                LeftABI

Brachial        630biGc7.04Brachial        368hhDr1.99

Ankle(PT)       111mmHgAnkle(PT)       114mmHg

Ankle(DP)       120mmHgAnkle(DP)       114mmHg



Findings

Normal bilateral MUNA. 



Critical Notification

Critical Value: No



<Conclusion>

1. Normal bilateral MUNA



Signed by : Lavon Marcos, 

Electronically Approved : 08/11/2021 17:01:32

## 2021-08-12 NOTE — RAD
MR#: [id number]

Account#: [id number]

Accession#: [id number]

Date of Study: 08/06/2021

Ordering Physician: CRESCENCIO MARCOS, 

Referring Physician: CRESCENCIO MARCOS, 

Tech: Marilyn Dennison RVT, [facility]





--------------- APPROVED REPORT --------------





Patient Location: OUT-PATIENT



Indications

PAD



Risk Factors

Hypertension



VELOCITY AND DOPPLER WAVEFORM ANALYSIS

RIGHT cm/secWaveformSeverity LEFT cm/secWaveform Severity 

dCFA 111.0BiphasicdCFA 112.0Triphasic

Prof Fem Art. 60.0BiphasicProf Fem Art. 103.0Biphasic

Fem Art Prox. 77.0BiphasicFem Art Prox. 72.0Biphasic

Fem Art Mid. 215.0BiphasicFem Art Mid. 90.0Biphasic

Fem Art Dist. 76.0BiphasicFem Art Dist. 58.0Biphasic

Pop Art(Fossa) 56.0BiphasicPop Art(AK) 49.0Biphasic

PTA Prox. 25.0BiphasicPTA Prox. 36.0Biphasic

PTA Dist. 49.0BiphasicPTA Dist. 45.0Biphasic

Per Art Dist.27.0BiphasicPer Art Dist.36.0Biphasic

KERRY Prox. 46.0BiphasicATA Prox. 42.0Biphasic

DPA 28BiphasicDPA 28Biphasic



Findings

Grayscale images of the bilateral lower extremity arterial vessels demonstrates mild to moderate diff
use atherosclerosis.



On the right side there is likely a moderate 50% stenosis involving the mid SFA.  Below the knee ther
e is three-vessel runoff with diminished velocities but biphasic wave patterns.



On the left side overall no significant above-knee disease is noted.  Below the knee the velocities a
re slightly diminished but biphasic waveforms are noted throughout.  There is three-vessel runoff.



Critical Notification

Critical Value: No



<Conclusion>

1.  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  No significant left-sided disease.



Signed by : Crescencio Marcos, 

Electronically Approved : 08/12/2021 09:46:49

## 2021-09-20 ENCOUNTER — HOSPITAL ENCOUNTER (EMERGENCY)
Dept: HOSPITAL 61 - ER | Age: 76
Discharge: HOME | End: 2021-09-20
Payer: MEDICARE

## 2021-09-20 VITALS — SYSTOLIC BLOOD PRESSURE: 131 MMHG | DIASTOLIC BLOOD PRESSURE: 73 MMHG

## 2021-09-20 VITALS — WEIGHT: 198.42 LBS | BODY MASS INDEX: 29.39 KG/M2 | HEIGHT: 69 IN

## 2021-09-20 DIAGNOSIS — I25.2: ICD-10-CM

## 2021-09-20 DIAGNOSIS — I10: ICD-10-CM

## 2021-09-20 DIAGNOSIS — R74.8: ICD-10-CM

## 2021-09-20 DIAGNOSIS — I95.9: ICD-10-CM

## 2021-09-20 DIAGNOSIS — Z95.1: ICD-10-CM

## 2021-09-20 DIAGNOSIS — Z87.891: ICD-10-CM

## 2021-09-20 DIAGNOSIS — Z95.5: ICD-10-CM

## 2021-09-20 DIAGNOSIS — R55: Primary | ICD-10-CM

## 2021-09-20 DIAGNOSIS — E78.00: ICD-10-CM

## 2021-09-20 LAB
ALBUMIN SERPL-MCNC: 3.6 G/DL (ref 3.4–5)
ALBUMIN/GLOB SERPL: 1.1 {RATIO} (ref 1–1.7)
ALP SERPL-CCNC: 74 U/L (ref 46–116)
ALT SERPL-CCNC: 22 U/L (ref 16–63)
ANION GAP SERPL CALC-SCNC: 10 MMOL/L (ref 6–14)
AST SERPL-CCNC: 13 U/L (ref 15–37)
BASOPHILS # BLD AUTO: 0.2 X10^3/UL (ref 0–0.2)
BASOPHILS NFR BLD: 2 % (ref 0–3)
BILIRUB SERPL-MCNC: 0.3 MG/DL (ref 0.2–1)
BUN SERPL-MCNC: 36 MG/DL (ref 8–26)
BUN/CREAT SERPL: 16 (ref 6–20)
CALCIUM SERPL-MCNC: 8.9 MG/DL (ref 8.5–10.1)
CHLORIDE SERPL-SCNC: 105 MMOL/L (ref 98–107)
CO2 SERPL-SCNC: 27 MMOL/L (ref 21–32)
CREAT SERPL-MCNC: 2.3 MG/DL (ref 0.7–1.3)
EOSINOPHIL NFR BLD: 0.3 X10^3/UL (ref 0–0.7)
EOSINOPHIL NFR BLD: 3 % (ref 0–3)
ERYTHROCYTE [DISTWIDTH] IN BLOOD BY AUTOMATED COUNT: 15.3 % (ref 11.5–14.5)
GFR SERPLBLD BASED ON 1.73 SQ M-ARVRAT: 27.9 ML/MIN
GLUCOSE SERPL-MCNC: 93 MG/DL (ref 70–99)
HCT VFR BLD CALC: 45.3 % (ref 39–53)
HGB BLD-MCNC: 15 G/DL (ref 13–17.5)
LYMPHOCYTES # BLD: 3.3 X10^3/UL (ref 1–4.8)
LYMPHOCYTES NFR BLD AUTO: 28 % (ref 24–48)
MCH RBC QN AUTO: 30 PG (ref 25–35)
MCHC RBC AUTO-ENTMCNC: 33 G/DL (ref 31–37)
MCV RBC AUTO: 90 FL (ref 79–100)
MONO #: 1.2 X10^3/UL (ref 0–1.1)
MONOCYTES NFR BLD: 10 % (ref 0–9)
NEUT #: 6.7 X10^3/UL (ref 1.8–7.7)
NEUTROPHILS NFR BLD AUTO: 58 % (ref 31–73)
PLATELET # BLD AUTO: 282 X10^3/UL (ref 140–400)
POTASSIUM SERPL-SCNC: 4.8 MMOL/L (ref 3.5–5.1)
PROT SERPL-MCNC: 7 G/DL (ref 6.4–8.2)
RBC # BLD AUTO: 5.01 X10^6/UL (ref 4.3–5.7)
SODIUM SERPL-SCNC: 142 MMOL/L (ref 136–145)
WBC # BLD AUTO: 11.7 X10^3/UL (ref 4–11)

## 2021-09-20 PROCEDURE — 99285 EMERGENCY DEPT VISIT HI MDM: CPT

## 2021-09-20 PROCEDURE — 36415 COLL VENOUS BLD VENIPUNCTURE: CPT

## 2021-09-20 PROCEDURE — 84484 ASSAY OF TROPONIN QUANT: CPT

## 2021-09-20 PROCEDURE — 85025 COMPLETE CBC W/AUTO DIFF WBC: CPT

## 2021-09-20 PROCEDURE — 80053 COMPREHEN METABOLIC PANEL: CPT

## 2021-09-20 PROCEDURE — 96360 HYDRATION IV INFUSION INIT: CPT

## 2021-09-20 PROCEDURE — 71045 X-RAY EXAM CHEST 1 VIEW: CPT

## 2021-09-20 NOTE — RAD
EXAMINATION: Chest radiograph.



VIEWS: Single view



COMPARISON: 1/25/2021



INDICATION:76 years, Male, syncope and hypotensive



FINDINGS: 

Normal cardiomediastinal silhouette. No focal consolidation. No pleural effusion or pneumothorax. No 
acute osseous process. Median sternotomy wires.



IMPRESSION:

No acute cardiopulmonary process.



Electronically signed by: Jessica Gaytan MD (9/20/2021 7:07 PM) [facility]

## 2021-09-20 NOTE — PHYS DOC
Past Medical History


Past Medical History:  High Cholesterol, Hypertension, MI


 (SOLA FELICIANO)


Past Surgical History:  Angioplasty, Coronary Bypass Surgery, Other


Additional Past Surgical Histo:  l hip sx, 5 vessle cabg, stents x6


 (SOLA FELICIANO)


Smoking Status:  Former Smoker


Alcohol Use:  None


Drug Use:  None


 (SOLA FELICIANO)





General Adult


EDM:


Chief Complaint:  HYPOTENSION





HPI:


HPI:





Patient is a 76-year-old male presents to the emergency department via EMS with 

chief complaint of near syncopal episode at home today at approximately 1700.  

Patient reports he was eating bread at dinner when he felt a sudden onset of 

facial flushing, bilateral hand tingling, nausea and dizziness, felt he may pass

out, became diaphoretic.  Patient states he thought his blood pressure was low, 

checked his blood pressure with home blood pressure machine showed 76/57, states

his wife immediately called 911.  Patient reports the paramedics told him his 

heart rate was 30 and erratic.  Patient states he started feeling better in 

route to the emergency department and currently has no symptoms.  Patient denies

any chest pain or shortness of breath.  Denies abdominal pain, vomiting or 

diarrhea.  Currently denies nausea.  Patient states all his symptoms have 

resolved.  Patient reports having similar symptoms at least 2-3 times a month, 

states he believes it happens when he eats bread.  Patient denies other physical

complaints or physical concerns.


 (SOLA FELICIANO)





Review of Systems:


Review of Systems:


14 body systems of review of systems have been reviewed.  See HPI for pertinent 

positives and negative responses, otherwise all other systems are negative, 

nonpertinent or noncontributory.


Constitutional: Negative except as outlined in HPI above.


Skin: Negative except as outlined in HPI above.


Eyes:   Negative except as outlined in HPI above.


HENT: Negative except as outlined in HPI above.


Respiratory:   Negative except as outlined in HPI above.


Cardiovascular:   Negative except as outlined in HPI above.


GI:   Negative except as outlined in HPI above.


:  Negative except as outlined in HPI above.


Musculoskeletal:   Negative except as outlined in HPI above.


Integument:   Negative except as outlined in HPI above.


Neurologic:   Negative except as outlined in HPI above.


Endocrine:   Negative except as outlined in HPI above.


Lymphatic:  Negative except as outlined in HPI above.


Psychiatric:  Negative except as outlined in HPI above.


 (SOLA FELICIANO)





Heart Score:


C/O Chest Pain:  No


Risk Factors:


Risk Factors:  DM, Current or recent (<one month) smoker, HTN, HLP, family 

history of CAD, obesity.


Risk Scores:


Score 0 - 3:  2.5% MACE over next 6 weeks - Discharge Home


Score 4 - 6:  20.3% MACE over next 6 weeks - Admit for Clinical Observation


Score 7 - 10:  72.7% MACE over next 6 weeks - Early Invasive Strategies


 (SOLA FELICIANO)





Current Medications:


Patient reports current home medications as Lasix, pantoprazole, lisinopril, 

baby aspirin, atorvastatin.


Current Medications








 Medications


  (Trade)  Dose


 Ordered  Sig/Claude  Start Time


 Stop Time Status Last Admin


Dose Admin


 


 Sodium Chloride  1,000 ml @ 


 1,000 mls/hr  1X  ONCE  9/20/21 18:30


 9/20/21 19:29   











 (SOLA FELICIANO)





Allergies:


Allergies:





Allergies








Coded Allergies Type Severity Reaction Last Updated Verified


 


  No Known Drug Allergies    12/5/18 No








 (SOLA FELICIANO)





Physical Exam:


PE:





Constitutional: Well developed, well nourished, no acute distress, non-toxic 

appearance.  76-year-old male in no apparent distress.


HENT: Normocephalic, atraumatic.


Eyes: Conjunctiva normal, no discharge.


Neck: Normal range of motion, no stridor.


Cardiovascular: No cyanosis appreciated, distal cap refill less than 2 seconds. 

Heart sounds S1-S2, RRR.


Lungs & Thorax: Patient is in no respiratory distress, no audible adventitious 

lung sounds appreciated.  No adventitious lung sounds appreciated per 

auscultation, lung sounds clear to auscultate all lung fields.  Patient no 

respiratory distress.


Abdomen: Nontender, no abnormalities noted.


Skin: Warm, dry, no erythema, no rash.  


Back: No tenderness, no deformities.


Extremities: No tenderness, no cyanosis, no clubbing, ROM intact, no edema.  


Neurologic: Alert and oriented X 3, normal motor function, normal sensory 

function, no focal deficits noted. 


Psychologic: Affect normal, judgement normal, mood normal. 


 (SOLA FELICIANO)





Current Patient Data:


Vital Signs:





                                   Vital Signs








  Date Time  Temp Pulse Resp B/P (MAP) Pulse Ox O2 Delivery O2 Flow Rate FiO2


 


9/20/21 18:00 97.7 74 16 134/74 (94) 96 Room Air  





 97.7       








 (SOLA FELICIANO)





EKG:


EKG:


EKG performed at 1755 by ED nursing staff shows a normal sinus rhythm without 

other ectopy, heart rate 75 bpm, CO interval 0.162, QTc interval 0.436, no acute

STEMI, no ACS, no acute ischemia appreciated, EKG interpreted by ED attending 

physician Dr. Hackett.


 (SOLA FELICIANO)





Radiology/Procedures:


Radiology/Procedures:


[]


 (SOLA FELICIANO)





Course & Med Decision Making:


Course & Med Decision Making


Pertinent Labs and Imaging studies reviewed. (See chart for details)





76-year-old male, vital signs reviewed, presents emergency department concerning

 near syncopal episode at home.  Patient's physical presentation unremarkable, 

patient's explanation of events consistent with vasovagal episode.  Patient is 

currently symptom-free, with patient's cardiac history will order 

cardiorespiratory work-up.  Patient is amenable to ED work-up planning.





End of shift report given to Dr. Allen.  Tests pending at this time.





 (SOLA FELICIANO)


Course & Med Decision Making


Patient was evaluated for chief complaint.


Workup consisted of labs and radiologic imaging.


Results reviewed and discussed with patient.


Patient noted to have elevated Cr-- 2.3-- comparable to previous.


Discussed lab with patient and he is unaware.


Patient advised to follow up with PCP regarding Cr.


Patient also states has previous had similar symptoms in past but becoming more 

frequent.


Advised to follow up with PCP regarding post prandial hypotension.





Treatment included IV fluids.


Patient was discharged.


 (WAYNE ALLEN DO)


Dragon Disclaimer:


Dragon Disclaimer:


This electronic medical record was generated, in whole or in part, using a voice

 recognition dictation system.


 (SOLA FELICIANO)





Departure


Departure


Impression:  


   Primary Impression:  


   Vasovagal response


   Additional Impressions:  


   Hypotension


   Elevated creatine kinase


Disposition:  01 HOME / SELF CARE / HOMELESS


Condition:  STABLE


Referrals:  


KARLOS GUAMAN (PCP)


Patient Instructions:  Dehydration, Adult, Hypotension





Additional Instructions:  


Elevated Creatine











SOLA FELICIANO       Sep 20, 2021 18:30


WAYNE ALLEN DO            Sep 20, 2021 19:54